# Patient Record
Sex: MALE | Race: BLACK OR AFRICAN AMERICAN | URBAN - METROPOLITAN AREA
[De-identification: names, ages, dates, MRNs, and addresses within clinical notes are randomized per-mention and may not be internally consistent; named-entity substitution may affect disease eponyms.]

---

## 2020-05-29 ENCOUNTER — ED HISTORICAL/CONVERTED ENCOUNTER (OUTPATIENT)
Dept: OTHER | Age: 30
End: 2020-05-29

## 2021-10-17 ENCOUNTER — EMERGENCY (EMERGENCY)
Facility: HOSPITAL | Age: 31
LOS: 1 days | Discharge: HOME | End: 2021-10-17
Admitting: EMERGENCY MEDICINE
Payer: COMMERCIAL

## 2021-10-17 VITALS
TEMPERATURE: 98 F | RESPIRATION RATE: 18 BRPM | DIASTOLIC BLOOD PRESSURE: 73 MMHG | SYSTOLIC BLOOD PRESSURE: 108 MMHG | WEIGHT: 154.98 LBS | OXYGEN SATURATION: 97 % | HEART RATE: 82 BPM

## 2021-10-17 DIAGNOSIS — M54.50 LOW BACK PAIN, UNSPECIFIED: ICD-10-CM

## 2021-10-17 DIAGNOSIS — M25.511 PAIN IN RIGHT SHOULDER: ICD-10-CM
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
## 2021-10-17 DIAGNOSIS — M54.40 LUMBAGO WITH SCIATICA, UNSPECIFIED SIDE: ICD-10-CM

## 2021-10-17 DIAGNOSIS — M25.551 PAIN IN RIGHT HIP: ICD-10-CM
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
## 2021-10-17 PROCEDURE — 73030 X-RAY EXAM OF SHOULDER: CPT | Mod: 26,RT

## 2021-10-17 PROCEDURE — 99284 EMERGENCY DEPT VISIT MOD MDM: CPT | Mod: 25

## 2021-10-17 PROCEDURE — 73502 X-RAY EXAM HIP UNI 2-3 VIEWS: CPT

## 2021-10-17 PROCEDURE — 73000 X-RAY EXAM OF COLLAR BONE: CPT | Mod: 26,RT

## 2021-10-17 PROCEDURE — 73502 X-RAY EXAM HIP UNI 2-3 VIEWS: CPT | Mod: 26,RT

## 2021-10-17 PROCEDURE — 73000 X-RAY EXAM OF COLLAR BONE: CPT

## 2021-10-17 PROCEDURE — 73030 X-RAY EXAM OF SHOULDER: CPT

## 2021-10-17 NOTE — ED PROVIDER NOTE - PROVIDER TOKENS
PROVIDER:[TOKEN:[[id number]]],PROVIDER:[TOKEN:[[id number]]],PROVIDER:[TOKEN:[[id number]]]
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
## 2021-10-17 NOTE — ED PROVIDER NOTE - CARE PROVIDER_API CALL
Angela Thurman)  Orthopaedic Surgery Surgery  [address] [address]  Phone: [phone]  Fax: [phone]  Follow Up Time:     Ruben Gaines)  Orthopaedic Surgery  [address] [place] [address]  Phone: [phone]  Fax: [phone]  Follow Up Time:     Aurelio Myers)  Orthopaedic Surgery  [address] [place] [address], [address]  [place] [address]  Phone: [phone]  Fax: [phone]  Follow Up Time:

## 2021-10-17 NOTE — ED PROVIDER NOTE - PHYSICAL EXAMINATION
VITAL SIGNS: I have reviewed nursing notes and confirm.  CONSTITUTIONAL: Well-developed;  in no acute distress.   SKIN:  warm and dry, no acute rash.   HEAD:  normocephalic, atraumatic.  EYES: PERRL, EOM intact; conjunctiva and sclera clear.  ENT: No nasal discharge; airway clear.   NECK: Supple; no midline tenderness. TTP over right trapezius.   BACK: No midline thoracic or lumbar tenderness.   CARD: S1, S2 normal; no murmurs, gallops, or rubs. Regular rate and rhythm.   RESP:  Clear to auscultation b/l, no wheezes, rales or rhonchi.  ABD: Normal bowel sounds; soft; non-distended; non-tender; no guarding/ rebound.  EXT: Normal ROM. He has ttp over anterior right shoulder and distal clavicle. He has full passive ROM. He has mild right lateral hip tenderness. No clubbing, cyanosis or edema. 2+ pulses to b/l ue/le.  NEURO: Alert, oriented, grossly unremarkable. 5/5 strength in BUE, sensation equal and intact.   PSYCH: Cooperative, mood and affect appropriate.

## 2021-10-17 NOTE — ED PROVIDER NOTE - NSFOLLOWUPINSTRUCTIONS_ED_ALL_ED_FT
Your xrays did not show any evidence of broken bones. This does not exclude soft tissue injury.    Please take ibuprofen 600mg up to three times daily for pain and inflammation. Apply ice pack over right shoulder for 20 minutes every 2-3 hours to reduce swelling and pain. Rest your arm and avoid heavy lifting until symptoms improve. If your pain persists, please call one of the orthopedists below for follow up.    You have been referred to a spine specialist today. Our referrals coordinator should reach out to you to help schedule this appointment. If you do not hear from her, please call [phone].     Return to the Emergency Department if you develop fever>100.4F, chest pain, shortness of breath, numbness, weakness or any other concerns.
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
## 2021-10-17 NOTE — ED PROVIDER NOTE - CLINICAL SUMMARY MEDICAL DECISION MAKING FREE TEXT BOX
Pt hemodynamically stable. He has no midline spinal tenderness. He has tenderness over anterior right shoulder but FROM. BUE neurovascularly intact. Pt ambulating without difficulty. Declined pain medication while in ED. The patient has xrays that are negative for acute fracture or dislocation; however, I did discuss with the patient the possibility of an occult or hairline fracture that is not immediately apparent on initial xray and that the patient will need follow up xrays within a week if pain persists; the patient does understand this.  The patient also has normal distal m/s/s and pulses; NVID; no ev of compartment syndrome or limb ischemia or neurovasc/nerve damage, and the patient was made aware of ssx that would be concerning for this and need for immediate return to ER. Discussed all results with pt. Will refer to spine today for further management of chronic back pain. Will refer to ortho if shoulder symptoms persist. Return precautions given.

## 2021-10-17 NOTE — ED PROVIDER NOTE - PATIENT PORTAL LINK FT
You can access the FollowMyHealth Patient Portal offered by [facility] by registering at the following website: [email or web address]. By joining [facility]’s FollowMyHealth portal, you will also be able to view your health information using other applications (apps) compatible with our system.

## 2021-10-17 NOTE — ED PROVIDER NOTE - CARE PROVIDERS DIRECT ADDRESSES
,[email or web address],DirectAddress_Unknown

## 2021-11-10 PROBLEM — Z78.9 OTHER SPECIFIED HEALTH STATUS: Chronic | Status: ACTIVE | Noted: 2021-10-17

## 2021-11-17 PROBLEM — Z00.00 ENCOUNTER FOR PREVENTIVE HEALTH EXAMINATION: Status: ACTIVE | Noted: 2021-11-17

## 2021-11-19 ENCOUNTER — APPOINTMENT (OUTPATIENT)
Dept: PHYSICAL MEDICINE AND REHAB | Facility: CLINIC | Age: 31
End: 2021-11-19

## 2022-12-28 NOTE — ED ADULT TRIAGE NOTE - INTERNATIONAL TRAVEL
Complete full course of antibiotics as directed. Continue rest, increase hydration, take tylenol/ibuprofen as needed for fever/pain. Return to clinic or follow up with PCP if you worsen or fail to improve. Patient verbalizes understanding and agrees with treatment plan. No